# Patient Record
Sex: FEMALE | Race: WHITE | NOT HISPANIC OR LATINO | Employment: UNEMPLOYED
[De-identification: names, ages, dates, MRNs, and addresses within clinical notes are randomized per-mention and may not be internally consistent; named-entity substitution may affect disease eponyms.]

---

## 2017-07-22 ENCOUNTER — HOSPITAL (OUTPATIENT)
Dept: OTHER | Age: 37
End: 2017-07-22
Attending: EMERGENCY MEDICINE

## 2017-10-02 ENCOUNTER — HOSPITAL (OUTPATIENT)
Dept: OTHER | Age: 37
End: 2017-10-02
Attending: FAMILY MEDICINE

## 2017-10-02 LAB
ALBUMIN SERPL-MCNC: 4.3 GM/DL (ref 3.6–5.1)
ALBUMIN/GLOB SERPL: 1.2 {RATIO} (ref 1–2.4)
ALP SERPL-CCNC: 78 UNIT/L (ref 45–117)
ALT SERPL-CCNC: 16 UNIT/L
ANALYZER ANC (IANC): NORMAL
ANION GAP SERPL CALC-SCNC: 13 MMOL/L (ref 10–20)
AST SERPL-CCNC: 14 UNIT/L
BASOPHILS # BLD: 0 THOUSAND/MCL (ref 0–0.3)
BASOPHILS NFR BLD: 1 %
BILIRUB SERPL-MCNC: 0.3 MG/DL (ref 0.2–1)
BUN SERPL-MCNC: 8 MG/DL (ref 6–20)
BUN/CREAT SERPL: 13 (ref 7–25)
CALCIUM SERPL-MCNC: 9.5 MG/DL (ref 8.4–10.2)
CHLORIDE: 103 MMOL/L (ref 98–107)
CHOLEST SERPL-MCNC: 201 MG/DL
CHOLEST/HDLC SERPL: 3 {RATIO}
CO2 SERPL-SCNC: 28 MMOL/L (ref 21–32)
CREAT SERPL-MCNC: 0.62 MG/DL (ref 0.51–0.95)
DIFFERENTIAL METHOD BLD: NORMAL
EOSINOPHIL # BLD: 0.1 THOUSAND/MCL (ref 0.1–0.5)
EOSINOPHIL NFR BLD: 1 %
ERYTHROCYTE [DISTWIDTH] IN BLOOD: 11.8 % (ref 11–15)
FREE T4: 1 NG/DL (ref 0.8–1.5)
GLOBULIN SER-MCNC: 3.5 GM/DL (ref 2–4)
GLUCOSE SERPL-MCNC: 79 MG/DL (ref 65–99)
HDLC SERPL-MCNC: 66 MG/DL
HEMATOCRIT: 42.3 % (ref 36–46.5)
HGB BLD-MCNC: 14.3 GM/DL (ref 12–15.5)
LDLC SERPL CALC-MCNC: 96 MG/DL
LENGTH OF FAST TIME PATIENT: 15 HR
LENGTH OF FAST TIME PATIENT: 15 HR
LYMPHOCYTES # BLD: 1.9 THOUSAND/MCL (ref 1–4.8)
LYMPHOCYTES NFR BLD: 33 %
MCH RBC QN AUTO: 29.8 PG (ref 26–34)
MCHC RBC AUTO-ENTMCNC: 33.8 GM/DL (ref 32–36.5)
MCV RBC AUTO: 88.1 FL (ref 78–100)
MONOCYTES # BLD: 0.3 THOUSAND/MCL (ref 0.3–0.9)
MONOCYTES NFR BLD: 6 %
NEUTROPHILS # BLD: 3.5 THOUSAND/MCL (ref 1.8–7.7)
NEUTROPHILS NFR BLD: 59 %
NEUTS SEG NFR BLD: NORMAL %
NONHDLC SERPL-MCNC: 135 MG/DL
PERCENT NRBC: NORMAL
PLATELET # BLD: 340 THOUSAND/MCL (ref 140–450)
POTASSIUM SERPL-SCNC: 4.1 MMOL/L (ref 3.4–5.1)
PROT SERPL-MCNC: 7.8 GM/DL (ref 6.4–8.2)
RBC # BLD: 4.8 MILLION/MCL (ref 4–5.2)
SODIUM SERPL-SCNC: 140 MMOL/L (ref 135–145)
TRIGLYCERIDE (TRIGP): 197 MG/DL
TSH SERPL-ACNC: 1.44 MCUNIT/ML (ref 0.35–5)
WBC # BLD: 5.8 THOUSAND/MCL (ref 4.2–11)

## 2022-01-20 ENCOUNTER — WALK IN (OUTPATIENT)
Dept: URGENT CARE | Age: 42
End: 2022-01-20
Attending: EMERGENCY MEDICINE

## 2022-01-20 VITALS
TEMPERATURE: 96.7 F | RESPIRATION RATE: 16 BRPM | HEART RATE: 92 BPM | OXYGEN SATURATION: 100 % | DIASTOLIC BLOOD PRESSURE: 87 MMHG | WEIGHT: 165 LBS | SYSTOLIC BLOOD PRESSURE: 166 MMHG | BODY MASS INDEX: 29.23 KG/M2

## 2022-01-20 DIAGNOSIS — J02.9 PHARYNGITIS, UNSPECIFIED ETIOLOGY: Primary | ICD-10-CM

## 2022-01-20 LAB
INTERNAL PROCEDURAL CONTROLS ACCEPTABLE: YES
S PYO AG THROAT QL IA.RAPID: NEGATIVE

## 2022-01-20 PROCEDURE — 99202 OFFICE O/P NEW SF 15 MIN: CPT

## 2022-01-20 PROCEDURE — 87880 STREP A ASSAY W/OPTIC: CPT | Performed by: EMERGENCY MEDICINE

## 2022-01-20 PROCEDURE — 87081 CULTURE SCREEN ONLY: CPT | Performed by: EMERGENCY MEDICINE

## 2022-01-20 PROCEDURE — 10002803 HB RX 637: Performed by: EMERGENCY MEDICINE

## 2022-01-20 RX ORDER — DEXAMETHASONE 4 MG/1
10 TABLET ORAL ONCE
Status: COMPLETED | OUTPATIENT
Start: 2022-01-20 | End: 2022-01-20

## 2022-01-20 RX ORDER — BENZONATATE 200 MG/1
200 CAPSULE ORAL 3 TIMES DAILY PRN
Qty: 21 CAPSULE | Refills: 0 | Status: SHIPPED | OUTPATIENT
Start: 2022-01-20 | End: 2022-01-27

## 2022-01-20 RX ADMIN — DEXAMETHASONE 10 MG: 4 TABLET ORAL at 17:38

## 2022-01-23 LAB — S PYO SPEC QL CULT: NORMAL

## 2023-05-16 ENCOUNTER — APPOINTMENT (OUTPATIENT)
Dept: URBAN - METROPOLITAN AREA CLINIC 242 | Age: 43
Setting detail: DERMATOLOGY
End: 2023-05-16

## 2023-05-16 DIAGNOSIS — D22 MELANOCYTIC NEVI: ICD-10-CM

## 2023-05-16 DIAGNOSIS — L82.1 OTHER SEBORRHEIC KERATOSIS: ICD-10-CM

## 2023-05-16 DIAGNOSIS — L82.0 INFLAMED SEBORRHEIC KERATOSIS: ICD-10-CM

## 2023-05-16 PROBLEM — D22.5 MELANOCYTIC NEVI OF TRUNK: Status: ACTIVE | Noted: 2023-05-16

## 2023-05-16 PROBLEM — D22.62 MELANOCYTIC NEVI OF LEFT UPPER LIMB, INCLUDING SHOULDER: Status: ACTIVE | Noted: 2023-05-16

## 2023-05-16 PROCEDURE — 17110 DESTRUCT B9 LESION 1-14: CPT

## 2023-05-16 PROCEDURE — 99203 OFFICE O/P NEW LOW 30 MIN: CPT | Mod: 25

## 2023-05-16 PROCEDURE — OTHER COUNSELING: OTHER

## 2023-05-16 PROCEDURE — OTHER LIQUID NITROGEN: OTHER

## 2023-05-16 ASSESSMENT — LOCATION DETAILED DESCRIPTION DERM
LOCATION DETAILED: RIGHT RIB CAGE
LOCATION DETAILED: RIGHT MEDIAL SUPERIOR CHEST
LOCATION DETAILED: EPIGASTRIC SKIN
LOCATION DETAILED: LEFT AXILLARY VAULT
LOCATION DETAILED: LEFT RIB CAGE
LOCATION DETAILED: RIGHT LATERAL ABDOMEN
LOCATION DETAILED: SUPERIOR THORACIC SPINE
LOCATION DETAILED: LEFT MEDIAL SUPERIOR CHEST
LOCATION DETAILED: RIGHT SUPERIOR LATERAL UPPER BACK
LOCATION DETAILED: SUBXIPHOID

## 2023-05-16 ASSESSMENT — LOCATION SIMPLE DESCRIPTION DERM
LOCATION SIMPLE: CHEST
LOCATION SIMPLE: LEFT AXILLARY VAULT
LOCATION SIMPLE: UPPER BACK
LOCATION SIMPLE: RIGHT BACK
LOCATION SIMPLE: ABDOMEN

## 2023-05-16 ASSESSMENT — LOCATION ZONE DERM
LOCATION ZONE: AXILLAE
LOCATION ZONE: TRUNK

## 2023-05-16 NOTE — PROCEDURE: LIQUID NITROGEN
Detail Level: Simple
Medical Necessity Clause: This procedure was medically necessary because the lesions that were treated were:
Spray Paint Technique: No
Post-Care Instructions: I reviewed with the patient in detail post-care instructions. Patient is to wear sunprotection, and avoid picking at any of the treated lesions. Pt may apply Vaseline to crusted or scabbing areas.
Spray Paint Text: The liquid nitrogen was applied to the skin utilizing a spray paint frosting technique.
Medical Necessity Information: It is in your best interest to select a reason for this procedure from the list below. All of these items fulfill various CMS LCD requirements except the new and changing color options.
Consent: The patient's consent was obtained including but not limited to risks of crusting, scabbing, blistering, scarring, darker or lighter pigmentary change, recurrence, incomplete removal and infection.
Show Aperture Variable?: Yes
Duration Of Freeze Thaw-Cycle (Seconds): 5-10
Number Of Freeze-Thaw Cycles: 3 freeze-thaw cycles

## 2023-08-26 ENCOUNTER — WALK IN (OUTPATIENT)
Dept: URGENT CARE | Age: 43
End: 2023-08-26
Attending: EMERGENCY MEDICINE

## 2023-08-26 VITALS
HEART RATE: 92 BPM | SYSTOLIC BLOOD PRESSURE: 132 MMHG | TEMPERATURE: 97.5 F | OXYGEN SATURATION: 100 % | RESPIRATION RATE: 16 BRPM | DIASTOLIC BLOOD PRESSURE: 80 MMHG | WEIGHT: 160 LBS

## 2023-08-26 DIAGNOSIS — L23.7 POISON IVY DERMATITIS: Primary | ICD-10-CM

## 2023-08-26 PROCEDURE — 99212 OFFICE O/P EST SF 10 MIN: CPT

## 2023-08-26 RX ORDER — LOSARTAN POTASSIUM AND HYDROCHLOROTHIAZIDE 12.5; 5 MG/1; MG/1
1 TABLET ORAL DAILY
COMMUNITY
Start: 2023-08-09

## 2023-08-26 RX ORDER — DIPHENHYDRAMINE HCL 25 MG
25 CAPSULE ORAL 3 TIMES DAILY
Qty: 24 CAPSULE | Refills: 0 | Status: SHIPPED | OUTPATIENT
Start: 2023-08-26

## 2023-08-26 RX ORDER — PREDNISONE 10 MG/1
TABLET ORAL
Qty: 30 TABLET | Refills: 0 | Status: SHIPPED | OUTPATIENT
Start: 2023-08-26 | End: 2023-09-07 | Stop reason: DRUGHIGH

## 2023-08-26 ASSESSMENT — ENCOUNTER SYMPTOMS
FEVER: 0
DIARRHEA: 0
HEADACHES: 0
COUGH: 0
BACK PAIN: 0
DIZZINESS: 0
VOMITING: 0
CHILLS: 0
BRUISES/BLEEDS EASILY: 0
SORE THROAT: 0
SHORTNESS OF BREATH: 0
ABDOMINAL PAIN: 0

## 2023-08-26 ASSESSMENT — PAIN SCALES - GENERAL: PAINLEVEL: 0

## 2023-09-07 ENCOUNTER — WALK IN (OUTPATIENT)
Dept: URGENT CARE | Age: 43
End: 2023-09-07
Attending: FAMILY MEDICINE

## 2023-09-07 VITALS
SYSTOLIC BLOOD PRESSURE: 133 MMHG | HEART RATE: 96 BPM | TEMPERATURE: 98.3 F | WEIGHT: 160 LBS | RESPIRATION RATE: 16 BRPM | DIASTOLIC BLOOD PRESSURE: 90 MMHG | OXYGEN SATURATION: 100 %

## 2023-09-07 DIAGNOSIS — L23.7 POISON IVY DERMATITIS: Primary | ICD-10-CM

## 2023-09-07 PROCEDURE — 99212 OFFICE O/P EST SF 10 MIN: CPT

## 2023-09-07 RX ORDER — HYDROXYZINE HYDROCHLORIDE 25 MG/1
25 TABLET, FILM COATED ORAL EVERY 6 HOURS PRN
Qty: 30 TABLET | Refills: 0 | Status: SHIPPED | OUTPATIENT
Start: 2023-09-07

## 2023-09-07 RX ORDER — PREDNISONE 20 MG/1
TABLET ORAL
Qty: 20 TABLET | Refills: 0 | Status: SHIPPED | OUTPATIENT
Start: 2023-09-07 | End: 2023-09-19

## 2023-09-07 RX ORDER — TRIAMCINOLONE ACETONIDE 1 MG/G
CREAM TOPICAL 2 TIMES DAILY
Qty: 30 G | Refills: 0 | Status: SHIPPED | OUTPATIENT
Start: 2023-09-07

## 2023-09-07 ASSESSMENT — ENCOUNTER SYMPTOMS
ADENOPATHY: 0
COUGH: 0
SHORTNESS OF BREATH: 0
TROUBLE SWALLOWING: 0
FEVER: 0
FATIGUE: 0

## 2023-09-07 ASSESSMENT — PAIN SCALES - GENERAL
PAINLEVEL_OUTOF10: 0
PAINLEVEL: 0

## 2024-01-08 DIAGNOSIS — R13.10 DYSPHAGIA: ICD-10-CM

## 2024-01-08 DIAGNOSIS — E04.9 GOITER: ICD-10-CM

## 2024-01-08 DIAGNOSIS — Z12.31 VISIT FOR SCREENING MAMMOGRAM: Primary | ICD-10-CM

## 2024-01-23 ENCOUNTER — HOSPITAL ENCOUNTER (OUTPATIENT)
Dept: ULTRASOUND IMAGING | Age: 44
Discharge: HOME | End: 2024-01-23
Attending: FAMILY MEDICINE

## 2024-01-23 ENCOUNTER — TELEPHONE (OUTPATIENT)
Dept: CT IMAGING | Age: 44
End: 2024-01-23

## 2024-01-23 ENCOUNTER — HOSPITAL ENCOUNTER (OUTPATIENT)
Dept: MAMMOGRAPHY | Age: 44
Discharge: HOME | End: 2024-01-23
Attending: FAMILY MEDICINE

## 2024-01-23 DIAGNOSIS — E04.9 GOITER: ICD-10-CM

## 2024-01-23 DIAGNOSIS — R13.10 DYSPHAGIA: ICD-10-CM

## 2024-01-23 DIAGNOSIS — Z12.31 VISIT FOR SCREENING MAMMOGRAM: ICD-10-CM

## 2024-01-23 PROCEDURE — 76536 US EXAM OF HEAD AND NECK: CPT

## 2024-01-23 PROCEDURE — 77063 BREAST TOMOSYNTHESIS BI: CPT

## 2024-01-24 ENCOUNTER — TELEPHONE (OUTPATIENT)
Dept: CT IMAGING | Age: 44
End: 2024-01-24

## 2024-02-12 ENCOUNTER — HOSPITAL ENCOUNTER (OUTPATIENT)
Dept: CT IMAGING | Age: 44
Discharge: HOME | End: 2024-02-12
Attending: FAMILY MEDICINE

## 2024-02-12 DIAGNOSIS — R92.8 ABNORMAL MAMMOGRAM: ICD-10-CM

## 2024-02-12 PROCEDURE — 77065 DX MAMMO INCL CAD UNI: CPT

## 2024-05-07 ENCOUNTER — TELEPHONE (OUTPATIENT)
Dept: VASCULAR SURGERY | Age: 44
End: 2024-05-07

## 2024-06-11 ENCOUNTER — APPOINTMENT (OUTPATIENT)
Dept: VASCULAR SURGERY | Age: 44
End: 2024-06-11

## 2024-06-11 VITALS
TEMPERATURE: 97.8 F | RESPIRATION RATE: 18 BRPM | OXYGEN SATURATION: 98 % | WEIGHT: 168 LBS | HEART RATE: 94 BPM | DIASTOLIC BLOOD PRESSURE: 90 MMHG | SYSTOLIC BLOOD PRESSURE: 133 MMHG | HEIGHT: 63 IN | BODY MASS INDEX: 29.77 KG/M2

## 2024-06-11 DIAGNOSIS — I83.11 VARICOSE VEINS OF RIGHT LOWER EXTREMITY WITH INFLAMMATION: ICD-10-CM

## 2024-06-11 DIAGNOSIS — I87.2 CHRONIC VENOUS INSUFFICIENCY: Primary | ICD-10-CM

## 2024-06-11 PROCEDURE — 99245 OFF/OP CONSLTJ NEW/EST HI 55: CPT | Performed by: SURGERY

## 2024-07-09 ENCOUNTER — APPOINTMENT (OUTPATIENT)
Dept: ULTRASOUND IMAGING | Age: 44
End: 2024-07-09
Attending: SURGERY

## 2024-08-13 ENCOUNTER — APPOINTMENT (OUTPATIENT)
Dept: VASCULAR SURGERY | Age: 44
End: 2024-08-13

## 2024-08-29 ENCOUNTER — HOSPITAL ENCOUNTER (OUTPATIENT)
Dept: CARDIOLOGY | Age: 44
Discharge: HOME | End: 2024-08-29
Attending: SURGERY

## 2024-08-29 DIAGNOSIS — I83.11 VARICOSE VEINS OF RIGHT LOWER EXTREMITY WITH INFLAMMATION: ICD-10-CM

## 2024-08-29 DIAGNOSIS — I87.2 CHRONIC VENOUS INSUFFICIENCY: ICD-10-CM

## 2024-08-29 PROCEDURE — 93970 EXTREMITY STUDY: CPT

## 2024-10-01 ENCOUNTER — PREP FOR CASE (OUTPATIENT)
Dept: VASCULAR SURGERY | Age: 44
End: 2024-10-01

## 2024-10-01 ENCOUNTER — APPOINTMENT (OUTPATIENT)
Dept: VASCULAR SURGERY | Age: 44
End: 2024-10-01

## 2024-10-01 VITALS
WEIGHT: 172.6 LBS | HEART RATE: 89 BPM | DIASTOLIC BLOOD PRESSURE: 87 MMHG | BODY MASS INDEX: 30.58 KG/M2 | OXYGEN SATURATION: 98 % | HEIGHT: 63 IN | TEMPERATURE: 96.6 F | SYSTOLIC BLOOD PRESSURE: 136 MMHG

## 2024-10-01 DIAGNOSIS — I83.11 VARICOSE VEINS OF RIGHT LOWER EXTREMITY WITH INFLAMMATION: ICD-10-CM

## 2024-10-01 DIAGNOSIS — I87.2 CHRONIC VENOUS INSUFFICIENCY: Primary | ICD-10-CM

## 2024-10-01 PROCEDURE — 99215 OFFICE O/P EST HI 40 MIN: CPT | Performed by: SURGERY

## 2024-10-01 RX ORDER — 0.9 % SODIUM CHLORIDE 0.9 %
2 VIAL (ML) INJECTION EVERY 12 HOURS SCHEDULED
OUTPATIENT
Start: 2024-10-01

## 2024-10-28 ENCOUNTER — TELEPHONE (OUTPATIENT)
Dept: VASCULAR SURGERY | Age: 44
End: 2024-10-28

## 2024-11-05 ENCOUNTER — APPOINTMENT (OUTPATIENT)
Dept: CARDIOLOGY | Age: 44
End: 2024-11-05

## 2024-11-21 ENCOUNTER — WALK IN (OUTPATIENT)
Dept: URGENT CARE | Age: 44
End: 2024-11-21
Attending: EMERGENCY MEDICINE

## 2024-11-21 VITALS
OXYGEN SATURATION: 100 % | WEIGHT: 164 LBS | HEART RATE: 89 BPM | SYSTOLIC BLOOD PRESSURE: 142 MMHG | DIASTOLIC BLOOD PRESSURE: 95 MMHG | TEMPERATURE: 97.1 F | BODY MASS INDEX: 29.05 KG/M2 | RESPIRATION RATE: 16 BRPM

## 2024-11-21 DIAGNOSIS — H10.9 CONJUNCTIVITIS OF BOTH EYES, UNSPECIFIED CONJUNCTIVITIS TYPE: Primary | ICD-10-CM

## 2024-11-21 PROCEDURE — 10002801 HB RX 250 W/O HCPCS: Performed by: EMERGENCY MEDICINE

## 2024-11-21 RX ORDER — FEXOFENADINE HCL 60 MG/1
60 TABLET, FILM COATED ORAL DAILY
Qty: 30 TABLET | Refills: 1 | Status: SHIPPED | OUTPATIENT
Start: 2024-11-21

## 2024-11-21 RX ORDER — OFLOXACIN 3 MG/ML
1-2 SOLUTION/ DROPS OPHTHALMIC 4 TIMES DAILY
Qty: 5 ML | Refills: 0 | Status: SHIPPED | OUTPATIENT
Start: 2024-11-21 | End: 2024-11-28

## 2024-11-21 RX ORDER — PREDNISONE 20 MG/1
20 TABLET ORAL DAILY
Qty: 5 TABLET | Refills: 0 | Status: SHIPPED | OUTPATIENT
Start: 2024-11-21 | End: 2024-11-26

## 2024-11-21 RX ORDER — TETRACAINE HYDROCHLORIDE 5 MG/ML
2 SOLUTION OPHTHALMIC ONCE
Status: COMPLETED | OUTPATIENT
Start: 2024-11-21 | End: 2024-11-21

## 2024-11-21 RX ADMIN — TETRACAINE HYDROCHLORIDE 2 DROP: 5 SOLUTION OPHTHALMIC at 09:03

## 2024-11-21 RX ADMIN — FLUORESCEIN SODIUM 1 STRIP: 1 STRIP OPHTHALMIC at 09:02

## 2024-11-21 ASSESSMENT — ENCOUNTER SYMPTOMS
COUGH: 0
EYE ITCHING: 1
FEVER: 0
VOMITING: 0
SHORTNESS OF BREATH: 0
SORE THROAT: 0
ABDOMINAL PAIN: 0
HEADACHES: 0
DIZZINESS: 0
BRUISES/BLEEDS EASILY: 0
DIARRHEA: 0
CHILLS: 0
BACK PAIN: 0

## 2024-11-21 ASSESSMENT — PAIN SCALES - GENERAL
PAINLEVEL_OUTOF10: 9
PAINLEVEL: 9

## 2024-12-12 SDOH — SOCIAL STABILITY: SOCIAL INSECURITY: HOW OFTEN DOES ANYONE, INCLUDING FAMILY AND FRIENDS, PHYSICALLY HURT YOU?: NEVER

## 2024-12-12 ASSESSMENT — ACTIVITIES OF DAILY LIVING (ADL)
ADL_BEFORE_ADMISSION: INDEPENDENT
SENSORY_SUPPORT_DEVICES: EYEGLASSES
ADL_SHORT_OF_BREATH: NO
ADL_SCORE: 12
NEEDS_ASSIST: NO
RECENT_DECLINE_ADL: NO
HISTORY OF FALLING IN THE LAST YEAR (PRIOR TO ADMISSION): NO

## 2024-12-13 ENCOUNTER — HOSPITAL ENCOUNTER (OUTPATIENT)
Age: 44
Discharge: HOME | End: 2024-12-13
Attending: SURGERY | Admitting: SURGERY

## 2024-12-13 ENCOUNTER — ANESTHESIA EVENT (OUTPATIENT)
Dept: SURGERY | Age: 44
End: 2024-12-13

## 2024-12-13 ENCOUNTER — ANESTHESIA (OUTPATIENT)
Dept: SURGERY | Age: 44
End: 2024-12-13

## 2024-12-13 VITALS
BODY MASS INDEX: 28.71 KG/M2 | TEMPERATURE: 96.8 F | WEIGHT: 162.04 LBS | RESPIRATION RATE: 20 BRPM | HEART RATE: 70 BPM | SYSTOLIC BLOOD PRESSURE: 118 MMHG | HEIGHT: 63 IN | OXYGEN SATURATION: 100 % | DIASTOLIC BLOOD PRESSURE: 78 MMHG

## 2024-12-13 DIAGNOSIS — I83.11 VARICOSE VEINS OF BOTH LOWER EXTREMITIES WITH INFLAMMATION: Primary | ICD-10-CM

## 2024-12-13 DIAGNOSIS — I87.2 CHRONIC VENOUS INSUFFICIENCY: ICD-10-CM

## 2024-12-13 DIAGNOSIS — I83.11 VARICOSE VEINS OF RIGHT LOWER EXTREMITY WITH INFLAMMATION: ICD-10-CM

## 2024-12-13 DIAGNOSIS — I83.12 VARICOSE VEINS OF BOTH LOWER EXTREMITIES WITH INFLAMMATION: Primary | ICD-10-CM

## 2024-12-13 LAB
B-HCG UR QL: NEGATIVE
INTERNAL PROCEDURAL CONTROLS ACCEPTABLE: YES
TEST LOT EXPIRATION DATE: NORMAL
TEST LOT NUMBER: NORMAL

## 2024-12-13 PROCEDURE — 13000123 HB VASCULAR BASIC CASE EA ADD MINUTE: Performed by: SURGERY

## 2024-12-13 PROCEDURE — 10002801 HB RX 250 W/O HCPCS: Performed by: SURGERY

## 2024-12-13 PROCEDURE — C1888 ENDOVAS NON-CARDIAC ABL CATH: HCPCS | Performed by: SURGERY

## 2024-12-13 PROCEDURE — 10004651 HB RX, NO CHARGE ITEM

## 2024-12-13 PROCEDURE — 10004452 HB PACU ADDL 30 MINUTES: Performed by: SURGERY

## 2024-12-13 PROCEDURE — C1894 INTRO/SHEATH, NON-LASER: HCPCS | Performed by: SURGERY

## 2024-12-13 PROCEDURE — 10002807 HB RX 258

## 2024-12-13 PROCEDURE — 36475 ENDOVENOUS RF 1ST VEIN: CPT | Performed by: SURGERY

## 2024-12-13 PROCEDURE — 10002800 HB RX 250 W HCPCS

## 2024-12-13 PROCEDURE — 81025 URINE PREGNANCY TEST: CPT | Performed by: SURGERY

## 2024-12-13 PROCEDURE — 10002801 HB RX 250 W/O HCPCS

## 2024-12-13 PROCEDURE — 13000006 HB ANESTHESIA MAC S/U + 1ST 15 MIN: Performed by: SURGERY

## 2024-12-13 PROCEDURE — 10006023 HB SUPPLY 272: Performed by: SURGERY

## 2024-12-13 PROCEDURE — 13000007 HB ANESTHESIA MAC EA ADD MINUTE: Performed by: SURGERY

## 2024-12-13 PROCEDURE — 13000001 HB PHASE II RECOVERY EA 30 MINUTES: Performed by: SURGERY

## 2024-12-13 PROCEDURE — 13000122 HB VASCULAR BASIC CASE S/U + 1ST 15 MIN: Performed by: SURGERY

## 2024-12-13 PROCEDURE — 10004451 HB PACU RECOVERY 1ST 30 MINUTES: Performed by: SURGERY

## 2024-12-13 PROCEDURE — 10002807 HB RX 258: Performed by: SURGERY

## 2024-12-13 RX ORDER — LIDOCAINE HYDROCHLORIDE 10 MG/ML
INJECTION, SOLUTION INFILTRATION; PERINEURAL PRN
Status: DISCONTINUED | OUTPATIENT
Start: 2024-12-13 | End: 2024-12-13 | Stop reason: HOSPADM

## 2024-12-13 RX ORDER — PROPOFOL 10 MG/ML
INJECTION, EMULSION INTRAVENOUS PRN
Status: DISCONTINUED | OUTPATIENT
Start: 2024-12-13 | End: 2024-12-13

## 2024-12-13 RX ORDER — 0.9 % SODIUM CHLORIDE 0.9 %
2 VIAL (ML) INJECTION EVERY 12 HOURS SCHEDULED
Status: DISCONTINUED | OUTPATIENT
Start: 2024-12-13 | End: 2024-12-13 | Stop reason: HOSPADM

## 2024-12-13 RX ORDER — LIDOCAINE HYDROCHLORIDE 10 MG/ML
INJECTION, SOLUTION INFILTRATION; PERINEURAL PRN
Status: DISCONTINUED | OUTPATIENT
Start: 2024-12-13 | End: 2024-12-13

## 2024-12-13 RX ORDER — SODIUM CHLORIDE, SODIUM LACTATE, POTASSIUM CHLORIDE, CALCIUM CHLORIDE 600; 310; 30; 20 MG/100ML; MG/100ML; MG/100ML; MG/100ML
INJECTION, SOLUTION INTRAVENOUS CONTINUOUS PRN
Status: DISCONTINUED | OUTPATIENT
Start: 2024-12-13 | End: 2024-12-13

## 2024-12-13 RX ORDER — MIDAZOLAM HYDROCHLORIDE 1 MG/ML
INJECTION, SOLUTION INTRAMUSCULAR; INTRAVENOUS PRN
Status: DISCONTINUED | OUTPATIENT
Start: 2024-12-13 | End: 2024-12-13

## 2024-12-13 RX ORDER — HYDROCODONE BITARTRATE AND ACETAMINOPHEN 5; 325 MG/1; MG/1
1 TABLET ORAL
Status: DISCONTINUED | OUTPATIENT
Start: 2024-12-13 | End: 2024-12-13 | Stop reason: HOSPADM

## 2024-12-13 RX ORDER — ONDANSETRON 2 MG/ML
4 INJECTION INTRAMUSCULAR; INTRAVENOUS 2 TIMES DAILY PRN
Status: DISCONTINUED | OUTPATIENT
Start: 2024-12-13 | End: 2024-12-13 | Stop reason: HOSPADM

## 2024-12-13 RX ORDER — ACETAMINOPHEN 325 MG/1
650 TABLET ORAL EVERY 4 HOURS PRN
Status: DISCONTINUED | OUTPATIENT
Start: 2024-12-13 | End: 2024-12-13 | Stop reason: HOSPADM

## 2024-12-13 RX ORDER — NALOXONE HCL 0.4 MG/ML
0.2 VIAL (ML) INJECTION EVERY 5 MIN PRN
Status: DISCONTINUED | OUTPATIENT
Start: 2024-12-13 | End: 2024-12-13 | Stop reason: HOSPADM

## 2024-12-13 RX ADMIN — ONDANSETRON 4 MG: 2 INJECTION INTRAMUSCULAR; INTRAVENOUS at 12:24

## 2024-12-13 RX ADMIN — ACETAMINOPHEN 650 MG: 325 TABLET ORAL at 11:57

## 2024-12-13 RX ADMIN — FENTANYL CITRATE 75 MCG: 50 INJECTION INTRAMUSCULAR; INTRAVENOUS at 11:12

## 2024-12-13 RX ADMIN — MIDAZOLAM HYDROCHLORIDE 2 MG: 1 INJECTION, SOLUTION INTRAMUSCULAR; INTRAVENOUS at 11:05

## 2024-12-13 RX ADMIN — FENTANYL CITRATE 25 MCG: 50 INJECTION INTRAMUSCULAR; INTRAVENOUS at 11:17

## 2024-12-13 RX ADMIN — PROPOFOL 30 MG: 10 INJECTION, EMULSION INTRAVENOUS at 11:12

## 2024-12-13 RX ADMIN — SODIUM CHLORIDE, POTASSIUM CHLORIDE, SODIUM LACTATE AND CALCIUM CHLORIDE: 600; 310; 30; 20 INJECTION, SOLUTION INTRAVENOUS at 11:05

## 2024-12-13 RX ADMIN — LIDOCAINE HYDROCHLORIDE 25 MG: 10 INJECTION, SOLUTION INFILTRATION; PERINEURAL at 11:12

## 2024-12-13 RX ADMIN — PROPOFOL 30 MCG/KG/MIN: 10 INJECTION, EMULSION INTRAVENOUS at 11:12

## 2024-12-13 ASSESSMENT — PAIN SCALES - WONG BAKER
WONGBAKER_NUMERICALRESPONSE: 1

## 2024-12-13 ASSESSMENT — PAIN SCALES - GENERAL
PAINLEVEL_OUTOF10: 0
PAINLEVEL_OUTOF10: 3
PAINLEVEL_OUTOF10: 3
PAINLEVEL_OUTOF10: 0
PAINLEVEL_OUTOF10: 3
PAINLEVEL_OUTOF10: 0

## 2024-12-17 ENCOUNTER — APPOINTMENT (OUTPATIENT)
Dept: CARDIOLOGY | Age: 44
End: 2024-12-17
Attending: SURGERY

## 2024-12-17 DIAGNOSIS — I87.2 CHRONIC VENOUS INSUFFICIENCY: ICD-10-CM

## 2024-12-17 DIAGNOSIS — I83.11 VARICOSE VEINS OF RIGHT LOWER EXTREMITY WITH INFLAMMATION: ICD-10-CM

## 2024-12-17 PROCEDURE — 93971 EXTREMITY STUDY: CPT | Performed by: SURGERY

## 2025-01-07 ENCOUNTER — OFFICE VISIT (OUTPATIENT)
Dept: VASCULAR SURGERY | Age: 45
End: 2025-01-07

## 2025-01-07 VITALS
SYSTOLIC BLOOD PRESSURE: 129 MMHG | WEIGHT: 154.5 LBS | BODY MASS INDEX: 27.38 KG/M2 | HEIGHT: 63 IN | OXYGEN SATURATION: 100 % | RESPIRATION RATE: 18 BRPM | DIASTOLIC BLOOD PRESSURE: 86 MMHG | HEART RATE: 76 BPM | TEMPERATURE: 97 F

## 2025-01-07 DIAGNOSIS — I83.811 VARICOSE VEINS OF RIGHT LOWER EXTREMITY WITH PAIN: Primary | ICD-10-CM

## 2025-01-07 PROCEDURE — 99214 OFFICE O/P EST MOD 30 MIN: CPT

## 2025-01-07 ASSESSMENT — ENCOUNTER SYMPTOMS
CONSTITUTIONAL NEGATIVE: 1
HEMATOLOGIC/LYMPHATIC NEGATIVE: 1
COLOR CHANGE: 1
RESPIRATORY NEGATIVE: 1
PSYCHIATRIC NEGATIVE: 1
GASTROINTESTINAL NEGATIVE: 1
NEUROLOGICAL NEGATIVE: 1

## 2025-02-04 ENCOUNTER — APPOINTMENT (OUTPATIENT)
Dept: VASCULAR SURGERY | Age: 45
End: 2025-02-04

## 2025-02-04 VITALS
WEIGHT: 154 LBS | HEART RATE: 78 BPM | DIASTOLIC BLOOD PRESSURE: 78 MMHG | SYSTOLIC BLOOD PRESSURE: 122 MMHG | HEIGHT: 63 IN | TEMPERATURE: 97.7 F | BODY MASS INDEX: 27.29 KG/M2 | OXYGEN SATURATION: 98 % | RESPIRATION RATE: 18 BRPM

## 2025-02-04 DIAGNOSIS — I83.813 VARICOSE VEINS OF BOTH LOWER EXTREMITIES WITH PAIN: Primary | ICD-10-CM

## 2025-02-04 ASSESSMENT — ENCOUNTER SYMPTOMS
PSYCHIATRIC NEGATIVE: 1
RESPIRATORY NEGATIVE: 1
GASTROINTESTINAL NEGATIVE: 1
CONSTITUTIONAL NEGATIVE: 1
HEMATOLOGIC/LYMPHATIC NEGATIVE: 1
COLOR CHANGE: 1
NEUROLOGICAL NEGATIVE: 1

## 2025-03-18 ENCOUNTER — APPOINTMENT (OUTPATIENT)
Dept: VASCULAR SURGERY | Age: 45
End: 2025-03-18

## 2025-03-18 ENCOUNTER — OFFICE VISIT (OUTPATIENT)
Dept: VASCULAR SURGERY | Age: 45
End: 2025-03-18

## 2025-03-18 VITALS
HEIGHT: 63 IN | SYSTOLIC BLOOD PRESSURE: 132 MMHG | HEART RATE: 82 BPM | WEIGHT: 144.9 LBS | DIASTOLIC BLOOD PRESSURE: 81 MMHG | OXYGEN SATURATION: 100 % | BODY MASS INDEX: 25.68 KG/M2

## 2025-03-18 DIAGNOSIS — I83.893 SYMPTOMATIC VARICOSE VEINS OF BOTH LOWER EXTREMITIES: Primary | ICD-10-CM

## 2025-03-19 ASSESSMENT — ENCOUNTER SYMPTOMS
NEUROLOGICAL NEGATIVE: 1
COLOR CHANGE: 1
CONSTITUTIONAL NEGATIVE: 1
HEMATOLOGIC/LYMPHATIC NEGATIVE: 1
RESPIRATORY NEGATIVE: 1
PSYCHIATRIC NEGATIVE: 1

## 2025-04-01 ENCOUNTER — APPOINTMENT (OUTPATIENT)
Dept: VASCULAR SURGERY | Age: 45
End: 2025-04-01

## 2025-05-06 ENCOUNTER — APPOINTMENT (OUTPATIENT)
Dept: VASCULAR SURGERY | Age: 45
End: 2025-05-06

## 2025-05-06 VITALS
HEIGHT: 63 IN | TEMPERATURE: 97.5 F | DIASTOLIC BLOOD PRESSURE: 76 MMHG | SYSTOLIC BLOOD PRESSURE: 115 MMHG | WEIGHT: 136 LBS | HEART RATE: 87 BPM | RESPIRATION RATE: 18 BRPM | BODY MASS INDEX: 24.1 KG/M2 | OXYGEN SATURATION: 100 %

## 2025-05-06 DIAGNOSIS — I83.893 VARICOSE VEINS OF LOWER EXTREMITIES WITH COMPLICATIONS, BILATERAL: Primary | ICD-10-CM

## 2025-05-06 ASSESSMENT — ENCOUNTER SYMPTOMS
COLOR CHANGE: 1
CONSTITUTIONAL NEGATIVE: 1
NEUROLOGICAL NEGATIVE: 1
PSYCHIATRIC NEGATIVE: 1
RESPIRATORY NEGATIVE: 1
HEMATOLOGIC/LYMPHATIC NEGATIVE: 1

## 2025-07-08 ENCOUNTER — APPOINTMENT (OUTPATIENT)
Dept: VASCULAR SURGERY | Age: 45
End: 2025-07-08

## 2025-07-08 VITALS
HEIGHT: 63 IN | BODY MASS INDEX: 23.04 KG/M2 | DIASTOLIC BLOOD PRESSURE: 78 MMHG | TEMPERATURE: 97.8 F | SYSTOLIC BLOOD PRESSURE: 119 MMHG | OXYGEN SATURATION: 100 % | RESPIRATION RATE: 18 BRPM | HEART RATE: 70 BPM | WEIGHT: 130 LBS

## 2025-07-08 DIAGNOSIS — I83.893 VARICOSE VEINS OF LOWER EXTREMITIES WITH COMPLICATIONS, BILATERAL: Primary | ICD-10-CM

## 2025-07-08 ASSESSMENT — ENCOUNTER SYMPTOMS
CONSTITUTIONAL NEGATIVE: 1
HEMATOLOGIC/LYMPHATIC NEGATIVE: 1
RESPIRATORY NEGATIVE: 1
PSYCHIATRIC NEGATIVE: 1
NEUROLOGICAL NEGATIVE: 1
COLOR CHANGE: 1

## 2025-08-12 ENCOUNTER — APPOINTMENT (OUTPATIENT)
Dept: VASCULAR SURGERY | Age: 45
End: 2025-08-12

## 2025-08-12 VITALS
RESPIRATION RATE: 18 BRPM | SYSTOLIC BLOOD PRESSURE: 129 MMHG | TEMPERATURE: 97.5 F | BODY MASS INDEX: 23.21 KG/M2 | OXYGEN SATURATION: 100 % | WEIGHT: 131 LBS | DIASTOLIC BLOOD PRESSURE: 82 MMHG | HEIGHT: 63 IN | HEART RATE: 81 BPM

## 2025-08-12 DIAGNOSIS — I83.893 VARICOSE VEINS OF LOWER EXTREMITIES WITH COMPLICATIONS, BILATERAL: Primary | ICD-10-CM

## 2025-08-12 ASSESSMENT — ENCOUNTER SYMPTOMS
RESPIRATORY NEGATIVE: 1
CONSTITUTIONAL NEGATIVE: 1
COLOR CHANGE: 1
NEUROLOGICAL NEGATIVE: 1
HEMATOLOGIC/LYMPHATIC NEGATIVE: 1
PSYCHIATRIC NEGATIVE: 1

## 2025-09-10 ENCOUNTER — APPOINTMENT (OUTPATIENT)
Dept: VASCULAR SURGERY | Age: 45
End: 2025-09-10

## (undated) DEVICE — COVER PROBE FLX-FEEL 48X6IN OR 4 FLAG 2 SHT 24 PRINT STRL LF

## (undated) DEVICE — DRAPE ARMBRD CVR TUBE HLDR FLUID COLLECTION POUCH 100X60IN

## (undated) DEVICE — STOCKINETTE L48 IN X W12 IN IMPRV PULL TAB HLW ORTHO

## (undated) DEVICE — CATHETER VENCLOSE 6F X 60CM

## (undated) DEVICE — SPONGE GAUZE L4 IN X W3 IN ABS NONWOVEN POSTOP XRAY DETECT

## (undated) DEVICE — PACK SURG VENCLOSE RF ABLT

## (undated) DEVICE — SET INTRO L7 CM .018 IN STRGT MIC GW SHTH DIL OD7 FR ODSEC21

## (undated) DEVICE — 2% CHLORHEXIDINE SKIN PREP ORANGE 26ML

## (undated) DEVICE — SPONGE LAPAROTOMY 18X18IN STERILE 5 PK

## (undated) DEVICE — TUBING INFLTR 15.5FT .16IN .11IN KLEIN SPK SIL PUMP DRIP

## (undated) DEVICE — BANDAGE CMPR EZBND 5.5YDX6IN ELASTIC SLFCLSR KNIT SUP ABS